# Patient Record
Sex: MALE | Race: WHITE | NOT HISPANIC OR LATINO | Employment: FULL TIME | URBAN - METROPOLITAN AREA
[De-identification: names, ages, dates, MRNs, and addresses within clinical notes are randomized per-mention and may not be internally consistent; named-entity substitution may affect disease eponyms.]

---

## 2017-02-04 ENCOUNTER — OFFICE VISIT (OUTPATIENT)
Dept: URGENT CARE | Facility: MEDICAL CENTER | Age: 32
End: 2017-02-04
Payer: COMMERCIAL

## 2017-02-04 PROCEDURE — 99212 OFFICE O/P EST SF 10 MIN: CPT

## 2017-09-24 ENCOUNTER — OFFICE VISIT (OUTPATIENT)
Dept: URGENT CARE | Facility: MEDICAL CENTER | Age: 32
End: 2017-09-24
Payer: COMMERCIAL

## 2017-09-24 PROCEDURE — 99213 OFFICE O/P EST LOW 20 MIN: CPT

## 2018-01-18 NOTE — MISCELLANEOUS
Message  Return to work or school:   Theoplis Gum is under my professional care  He was seen in my office on March 1, 2016   He is able to return to work on   March 3, 2016            Signatures   Electronically signed by : Tammy Weeks, [facility]; Mar  1 2016  1:48PM EST                       (Author)